# Patient Record
Sex: FEMALE | Race: WHITE | NOT HISPANIC OR LATINO | Employment: OTHER | URBAN - METROPOLITAN AREA
[De-identification: names, ages, dates, MRNs, and addresses within clinical notes are randomized per-mention and may not be internally consistent; named-entity substitution may affect disease eponyms.]

---

## 2023-08-19 ENCOUNTER — HOSPITAL ENCOUNTER (OUTPATIENT)
Facility: HOSPITAL | Age: 63
Discharge: HOME | End: 2023-08-19
Attending: STUDENT IN AN ORGANIZED HEALTH CARE EDUCATION/TRAINING PROGRAM | Admitting: STUDENT IN AN ORGANIZED HEALTH CARE EDUCATION/TRAINING PROGRAM
Payer: MEDICARE

## 2023-08-19 VITALS
OXYGEN SATURATION: 98 % | HEART RATE: 84 BPM | SYSTOLIC BLOOD PRESSURE: 164 MMHG | BODY MASS INDEX: 24.55 KG/M2 | HEIGHT: 61 IN | WEIGHT: 130 LBS | DIASTOLIC BLOOD PRESSURE: 76 MMHG | TEMPERATURE: 97.5 F | RESPIRATION RATE: 16 BRPM

## 2023-08-19 DIAGNOSIS — F41.9 ANXIETY: ICD-10-CM

## 2023-08-19 DIAGNOSIS — F51.01 PRIMARY INSOMNIA: ICD-10-CM

## 2023-08-19 DIAGNOSIS — M54.2 NECK PAIN, MUSCULOSKELETAL: Primary | ICD-10-CM

## 2023-08-19 PROCEDURE — G0463 HOSPITAL OUTPT CLINIC VISIT: HCPCS

## 2023-08-19 RX ORDER — CYCLOBENZAPRINE HCL 5 MG
TABLET ORAL
Qty: 60 TABLET | Refills: 0 | Status: SHIPPED | OUTPATIENT
Start: 2023-08-19

## 2023-08-19 RX ORDER — HYDROXYZINE HYDROCHLORIDE 25 MG/1
25 TABLET, FILM COATED ORAL 3 TIMES DAILY PRN
COMMUNITY

## 2023-08-19 NOTE — DISCHARGE INSTRUCTIONS
Use Flexeril as prescribed as needed for muscle tension.  Return to emergency department for worsening symptoms or other medical emergencies.  Recommended follow-up with PCP.  Refer to the attached instructions for further information.  Recommend over-the-counter melatonin 1 mg or 3 mg at night for sleep.   [de-identified] : Impression osteoarthritis left hip, left groin dermatitis\par Plan elective hip replacement is counseled continue dermatological care follow-up when skin lesions have healed

## 2023-08-19 NOTE — FSED PROVIDER NOTE
"Subjective   History of Present Illness  Patient is a 63-year-old female who presents to the emergency department for anxiety x2 months that comes and goes.  Onset after dental procedure, in which the patient had lots of swelling and irritation.  All dental and facial symptoms have since resolved, but anxiety persists.  She reports generalized neck tension due to stress.  Patient has associated rib tightness and nausea with episodes of anxiety.  Not associated with exertion.  Patient has not been established with a PCP in over 20 years.  She recently went to see PCP who prescribed hydroxyzine 25 mg as needed, which has provided minimal to no relief.  She reports general difficulty sleeping, worse in the last week, mildly better with hydroxyzine.  Denies shortness of breath, chest pain, cough, recent illness, fever, palpitations.       Review of Systems   Constitutional:  Negative for chills and fever.   Eyes:  Negative for pain and visual disturbance.   Respiratory:  Positive for chest tightness (\"under ribs\"). Negative for cough and shortness of breath.    Cardiovascular:  Negative for chest pain, palpitations and leg swelling.   Gastrointestinal:  Positive for nausea. Negative for abdominal pain, constipation, diarrhea and vomiting.   Genitourinary:  Negative for dysuria and flank pain.   Musculoskeletal:  Positive for neck pain. Negative for arthralgias, myalgias and neck stiffness.   Skin:  Negative for color change, pallor, rash and wound.   Neurological:  Negative for dizziness, syncope and headaches.   Psychiatric/Behavioral:  Positive for sleep disturbance. Negative for agitation, hallucinations, self-injury and suicidal ideas. The patient is nervous/anxious.      History reviewed. No pertinent past medical history.    Allergies   Allergen Reactions    Sulfamethoxazole-Trimethoprim Other (See Comments)     Back pain       History reviewed. No pertinent surgical history.    History reviewed. No pertinent " "family history.    Social History     Socioeconomic History    Marital status:    Tobacco Use    Smoking status: Never    Smokeless tobacco: Never   Vaping Use    Vaping Use: Never used   Substance and Sexual Activity    Alcohol use: Never    Drug use: Never    Sexual activity: Defer           Objective   Physical Exam  Constitutional:       General: She is not in acute distress.     Appearance: She is normal weight. She is not toxic-appearing.   Cardiovascular:      Rate and Rhythm: Normal rate and regular rhythm.   Pulmonary:      Effort: Pulmonary effort is normal.      Breath sounds: Normal breath sounds.   Abdominal:      General: Abdomen is flat. There is no distension.      Palpations: Abdomen is soft.      Tenderness: There is no abdominal tenderness. There is no guarding.   Musculoskeletal:         General: No swelling, tenderness or signs of injury. Normal range of motion.      Cervical back: Normal range of motion. Tenderness (muscular paraspinal right sided) present.   Skin:     General: Skin is warm and dry.      Findings: No bruising or lesion.   Neurological:      General: No focal deficit present.      Mental Status: She is alert and oriented to person, place, and time.      Motor: No weakness.      Gait: Gait normal.   Psychiatric:         Mood and Affect: Mood is anxious.         Behavior: Behavior normal.       Procedures           ED Course                                             Medical Decision Making  Patient is 63-year-old female who presents emergency department for anxiety.  Symptoms include \"pressure under her ribs,\" nausea, insomnia, neck tension.  Patient reports \" getting all worked up\" mentally brings on the symptoms.  Symptoms are not present currently in urgent care.  No concerning signs or symptoms of cardiopulmonary disease.  History is of very low to no suspicion.  However, patient has not been to see PCP in many years.  Takes no medications daily.  I strongly " encouraged follow-up for ongoing evaluation and management, as well as regulatory checkup for hypertension, cholesterol, etc. Patient vital signs WNL.  No acute distress.  Appears anxious, but otherwise well-appearing.  Some mild paraspinal cervical tenderness to palpation.  Will prescribe muscle relaxers to help with muscle tension and sleep.  Discussed when to return to the emergency department.  Answered all questions.  Patient verbalized understanding and was agreeable to plan and discharge    My differential diagnosis includes but is not limited to: Anxiety, muscle strain, rib fracture, MI, angina, bacterial infection, viral infection, palpitations, pneumonia    Problems Addressed:  Anxiety: complicated acute illness or injury  Neck pain, musculoskeletal: complicated acute illness or injury  Primary insomnia: complicated acute illness or injury    Risk  Prescription drug management.        Final diagnoses:   Neck pain, musculoskeletal   Anxiety   Primary insomnia       ED Disposition  ED Disposition       ED Disposition   Discharge    Condition   Stable    Comment   --               Provider, No Known  [place] [id number]  [phone]    Schedule an appointment as soon as possible for a visit            Medication List        New Prescriptions      cyclobenzaprine 5 MG tablet  Commonly known as: FLEXERIL  Take 1-2 tablets 3 times daily as needed for muscle spasms and pain.               Where to Get Your Medications        These medications were sent to [facility] [id number] - [place], [place] - [address] [place] [address] [address]  - [phone]  - [phone] [address] [place] [address]      Phone: [phone]   cyclobenzaprine 5 MG tablet